# Patient Record
Sex: MALE | Race: WHITE | NOT HISPANIC OR LATINO | URBAN - METROPOLITAN AREA
[De-identification: names, ages, dates, MRNs, and addresses within clinical notes are randomized per-mention and may not be internally consistent; named-entity substitution may affect disease eponyms.]

---

## 2018-09-19 ENCOUNTER — EMERGENCY (EMERGENCY)
Facility: HOSPITAL | Age: 48
LOS: 1 days | Discharge: HOME | End: 2018-09-19
Attending: EMERGENCY MEDICINE | Admitting: EMERGENCY MEDICINE
Payer: SELF-PAY

## 2018-09-19 VITALS
RESPIRATION RATE: 18 BRPM | TEMPERATURE: 98 F | SYSTOLIC BLOOD PRESSURE: 124 MMHG | DIASTOLIC BLOOD PRESSURE: 87 MMHG | HEART RATE: 84 BPM | OXYGEN SATURATION: 97 %

## 2018-09-19 DIAGNOSIS — R21 RASH AND OTHER NONSPECIFIC SKIN ERUPTION: ICD-10-CM

## 2018-09-19 DIAGNOSIS — L30.9 DERMATITIS, UNSPECIFIED: ICD-10-CM

## 2018-09-19 DIAGNOSIS — B02.9 ZOSTER WITHOUT COMPLICATIONS: ICD-10-CM

## 2018-09-19 PROCEDURE — 99284 EMERGENCY DEPT VISIT MOD MDM: CPT

## 2018-09-19 PROCEDURE — 99283 EMERGENCY DEPT VISIT LOW MDM: CPT

## 2018-09-19 RX ORDER — VALACYCLOVIR 500 MG/1
1 TABLET, FILM COATED ORAL
Qty: 21 | Refills: 0 | OUTPATIENT
Start: 2018-09-19 | End: 2018-09-25

## 2018-09-19 NOTE — ED PROVIDER NOTE - SKIN, MLM
+ Dermatitis to right chest wall consistent with herpes zoster, no signs of secondary infection.  Skin is otherwise normal color for race, warm, dry and intact.

## 2018-09-19 NOTE — ED PROVIDER NOTE - OBJECTIVE STATEMENT
47 year old male presents to ED with concern for rash to right chest wall consistent with herpes zoster.  Patient states he had shingles "about 13 years ago and it was the same thing."  He notes prodrome of pain to chest wall and later noticed the rash.  He admits to associated burning sensation to the overlying area.  No fever, chills, shortness of breath, abdominal pain, nausea, vomiting, changes to bowel movements or any additional acute complaints or concerns at this time.

## 2018-09-19 NOTE — ED ADULT NURSE NOTE - OBJECTIVE STATEMENT
Pt presents c/o 7/10 right upper chest wall pain r/t rash.  Pt elicits it is difficult to move his arms d/t pain.  Pt denies fever/chills, N/V/D or exposure to environmental toxins.  Pt is pending eval by [facility].

## 2018-09-19 NOTE — ED ADULT NURSE NOTE - CHPI ED NUR SYMPTOMS NEG
no vomiting/no inflammation/no chills/no decreased eating/drinking/no body aches/no fever/no confusion/no petechia

## 2018-09-19 NOTE — ED PROVIDER NOTE - MEDICAL DECISION MAKING DETAILS
patient in ED with concern for rash to right chest consistent with herpes zoster.  Given rx for steroid, anti viral and pain meds.  He will follow up with his PCP in 1-2 days for re evaluation and return to ED should symptoms worsen prior to this recommended follow up.  Patient aware of plan and verbalizes his understanding.

## 2018-10-13 ENCOUNTER — EMERGENCY (EMERGENCY)
Facility: HOSPITAL | Age: 48
LOS: 1 days | Discharge: HOME | End: 2018-10-13
Attending: EMERGENCY MEDICINE | Admitting: EMERGENCY MEDICINE
Payer: SELF-PAY

## 2018-10-13 VITALS
OXYGEN SATURATION: 98 % | DIASTOLIC BLOOD PRESSURE: 113 MMHG | SYSTOLIC BLOOD PRESSURE: 163 MMHG | TEMPERATURE: 98 F | HEART RATE: 72 BPM | RESPIRATION RATE: 16 BRPM

## 2018-10-13 PROCEDURE — 99283 EMERGENCY DEPT VISIT LOW MDM: CPT | Mod: 25

## 2018-10-13 PROCEDURE — 99053 MED SERV 10PM-8AM 24 HR FAC: CPT

## 2018-10-13 RX ORDER — TRAMADOL HYDROCHLORIDE 50 MG/1
1 TABLET ORAL
Qty: 18 | Refills: 0 | OUTPATIENT
Start: 2018-10-13 | End: 2018-10-15

## 2018-10-13 RX ORDER — VALACYCLOVIR 500 MG/1
1000 TABLET, FILM COATED ORAL ONCE
Qty: 0 | Refills: 0 | Status: COMPLETED | OUTPATIENT
Start: 2018-10-13 | End: 2018-10-13

## 2018-10-13 RX ORDER — OXYCODONE AND ACETAMINOPHEN 5; 325 MG/1; MG/1
2 TABLET ORAL ONCE
Qty: 0 | Refills: 0 | Status: DISCONTINUED | OUTPATIENT
Start: 2018-10-13 | End: 2018-10-13

## 2018-10-13 RX ORDER — VALACYCLOVIR 500 MG/1
1 TABLET, FILM COATED ORAL
Qty: 28 | Refills: 0 | OUTPATIENT
Start: 2018-10-13 | End: 2018-10-26

## 2018-10-13 RX ADMIN — VALACYCLOVIR 1000 MILLIGRAM(S): 500 TABLET, FILM COATED ORAL at 04:40

## 2018-10-13 RX ADMIN — OXYCODONE AND ACETAMINOPHEN 2 TABLET(S): 5; 325 TABLET ORAL at 04:40

## 2018-10-13 NOTE — ED ADULT NURSE NOTE - NSIMPLEMENTINTERV_GEN_ALL_ED
Implemented All Universal Safety Interventions:  [place] to call system. Call bell, personal items and telephone within reach. Instruct patient to call for assistance. Room bathroom lighting operational. Non-slip footwear when patient is off stretcher. Physically safe environment: no spills, clutter or unnecessary equipment. Stretcher in lowest position, wheels locked, appropriate side rails in place.

## 2018-10-13 NOTE — ED ADULT NURSE NOTE - OBJECTIVE STATEMENT
pt aox4. neurologically wnl. no sob or difficulty breathing noted. lung sounds clear bilaterally. skin appropriate for ethnicity, warm and dry. clustered open rash noted to R chest that is painful and burning. cap refill < 2 secs. pulses 2+ and regular. abd rounded soft and nontender.

## 2018-10-13 NOTE — ED ADULT TRIAGE NOTE - CHIEF COMPLAINT QUOTE
ambulatory, complaining of worsening rash to right chest. States he was dx with herpes zoster last 09/19, completed his meds.

## 2018-10-17 DIAGNOSIS — R07.89 OTHER CHEST PAIN: ICD-10-CM

## 2018-10-17 DIAGNOSIS — Z79.899 OTHER LONG TERM (CURRENT) DRUG THERAPY: ICD-10-CM

## 2018-10-17 DIAGNOSIS — F17.210 NICOTINE DEPENDENCE, CIGARETTES, UNCOMPLICATED: ICD-10-CM

## 2018-10-17 DIAGNOSIS — B02.9 ZOSTER WITHOUT COMPLICATIONS: ICD-10-CM

## 2018-10-17 DIAGNOSIS — Z79.52 LONG TERM (CURRENT) USE OF SYSTEMIC STEROIDS: ICD-10-CM

## 2018-10-17 DIAGNOSIS — Z79.2 LONG TERM (CURRENT) USE OF ANTIBIOTICS: ICD-10-CM

## 2020-10-29 ENCOUNTER — EMERGENCY (EMERGENCY)
Facility: HOSPITAL | Age: 50
LOS: 1 days | Discharge: HOME | End: 2020-10-29
Attending: EMERGENCY MEDICINE | Admitting: EMERGENCY MEDICINE
Payer: SELF-PAY

## 2020-10-29 VITALS
RESPIRATION RATE: 18 BRPM | DIASTOLIC BLOOD PRESSURE: 78 MMHG | OXYGEN SATURATION: 98 % | HEART RATE: 82 BPM | SYSTOLIC BLOOD PRESSURE: 125 MMHG

## 2020-10-29 VITALS
WEIGHT: 300.05 LBS | DIASTOLIC BLOOD PRESSURE: 87 MMHG | SYSTOLIC BLOOD PRESSURE: 127 MMHG | TEMPERATURE: 98 F | OXYGEN SATURATION: 96 % | RESPIRATION RATE: 16 BRPM | HEART RATE: 71 BPM | HEIGHT: 74 IN

## 2020-10-29 DIAGNOSIS — B02.9 ZOSTER WITHOUT COMPLICATIONS: ICD-10-CM

## 2020-10-29 DIAGNOSIS — L29.9 PRURITUS, UNSPECIFIED: ICD-10-CM

## 2020-10-29 PROCEDURE — 99283 EMERGENCY DEPT VISIT LOW MDM: CPT

## 2020-10-29 RX ORDER — VALACYCLOVIR 500 MG/1
1000 TABLET, FILM COATED ORAL ONCE
Refills: 0 | Status: COMPLETED | OUTPATIENT
Start: 2020-10-29 | End: 2020-10-29

## 2020-10-29 RX ORDER — OXYCODONE AND ACETAMINOPHEN 5; 325 MG/1; MG/1
1 TABLET ORAL ONCE
Refills: 0 | Status: DISCONTINUED | OUTPATIENT
Start: 2020-10-29 | End: 2020-10-29

## 2020-10-29 RX ORDER — VALACYCLOVIR 500 MG/1
1 TABLET, FILM COATED ORAL
Qty: 21 | Refills: 0
Start: 2020-10-29 | End: 2020-11-04

## 2020-10-29 RX ORDER — OXYCODONE AND ACETAMINOPHEN 5; 325 MG/1; MG/1
1 TABLET ORAL
Qty: 9 | Refills: 0
Start: 2020-10-29 | End: 2020-10-31

## 2020-10-29 RX ADMIN — VALACYCLOVIR 1000 MILLIGRAM(S): 500 TABLET, FILM COATED ORAL at 11:53

## 2020-10-29 RX ADMIN — OXYCODONE AND ACETAMINOPHEN 1 TABLET(S): 5; 325 TABLET ORAL at 11:53

## 2020-10-29 NOTE — ED PROVIDER NOTE - PATIENT PORTAL LINK FT
You can access the FollowMyHealth Patient Portal offered by [facility] by registering at the following website: [email or web address]. By joining [facility]’s FollowMyHealth portal, you will also be able to view your health information using other applications (apps) compatible with our system.

## 2020-10-29 NOTE — ED ADULT NURSE NOTE - OBJECTIVE STATEMENT
Pt p/w shingles rash x 1 d with burning to site x 3 days. States rash started yesterday to right chest wall with +chest pain. Denies fevers, chills, SOB.

## 2020-10-29 NOTE — ED PROVIDER NOTE - OBJECTIVE STATEMENT
The pt is a 48 y/o M, who presents to ED c/o shingles rash x 1 d. States that had some burning to site 3 d ago, but rash started yest, rash to R chest wall, + pain, + itching, has had singles in past and requesting 3 days of percocet "for acute pain". Denies fevers, chills, pus, bleeding, any other c/o

## 2020-10-29 NOTE — ED PROVIDER NOTE - SKIN, MLM
+ rash to r chest wall T7 dermatone, + scratch marks, + vesicles on erythematous base, no pus, no bleeding, no swelling, no super imposed inf

## 2020-10-29 NOTE — ED PROVIDER NOTE - NSFOLLOWUPINSTRUCTIONS_ED_ALL_ED_FT
Shingles  WHAT YOU NEED TO KNOW:  Shingles is a painful rash. Shingles is caused by the same virus that causes chickenpox (varicella-zoster). After you get chickenpox, the virus stays in your body for several years without causing any symptoms. Shingles occurs when the virus becomes active again. The active virus travels along a nerve to your skin and causes a rash.  DISCHARGE INSTRUCTIONS:  Call your local emergency number (911 in the ) if:   •You have trouble moving your arms, legs, or face.  •You become confused, or have difficulty speaking.  •You have a seizure.  Seek care immediately if:   •You have weakness in an arm or leg.  •You have dizziness, a severe headache, or hearing or vision loss.  •You have painful, red, warm skin around the blisters, or the blisters drain pus.  •Your neck is stiff or you have trouble moving it.  Call your doctor if:   •You feel weak or have a headache.  •You have a cough, chills, or a fever.  •You have abdominal pain or nausea, or you are vomiting.  •Your rash becomes more itchy or painful.  •Your rash spreads to other parts of your body.  •Your pain worsens and does not go away even after you take medicine.  •You have questions or concerns about your condition or care.  Medicines: You may need any of the following:  •Antiviral medicine helps decrease symptoms and healing time. It may also decrease your risk for nerve pain. You will need to start taking this medicine within 3 days of the start of symptoms to prevent nerve pain.  •Prescription pain medicine may be given. Ask your healthcare provider how to take this medicine safely. Some prescription pain medicines contain acetaminophen. Do not take other medicines that contain acetaminophen without talking to your healthcare provider. Too much acetaminophen may cause liver damage. Prescription pain medicine may cause constipation. Ask your healthcare provider how to prevent or treat constipation.   •Acetaminophen decreases pain and fever. It is available without a doctor's order. Ask how much to take and how often to take it. Follow directions. Read the labels of all other medicines you are using to see if they also contain acetaminophen, or ask your doctor or pharmacist. Acetaminophen can cause liver damage if not taken correctly. Do not use more than 4 grams (4,000 milligrams) total of acetaminophen in one day.   •NSAIDs, such as ibuprofen, help decrease swelling, pain, and fever. This medicine is available with or without a doctor's order. NSAIDs can cause stomach bleeding or kidney problems in certain people. If you take blood thinner medicine, always ask if NSAIDs are safe for you. Always read the medicine label and follow directions. Do not give these medicines to children under 6 months of age without direction from your child's healthcare provider.  •Topical anesthetics are used to numb the skin and decrease pain. They can be a cream, gel, spray, or patch.   •Anticonvulsants decrease nerve pain and may help you sleep at night.  •Antidepressants may be used to decrease nerve pain.  •Take your medicine as directed. Contact your healthcare provider if you think your medicine is not helping or if you have side effects. Tell him or her if you are allergic to any medicine. Keep a list of the medicines, vitamins, and herbs you take. Include the amounts, and when and why you take them. Bring the list or the pill bottles to follow-up visits. Carry your medicine list with you in case of an emergency.  Self-care: Keep your rash clean and dry. Cover your rash with a bandage or clothing. Do not use bandages that stick to your skin. The sticky part may irritate your skin and make your rash last longer.  Prevent the spread of the germs:   •Wash your hands often. Wash your hands several times each day. Wash after you use the bathroom, change a child's diaper, and before you prepare or eat food. Use soap and water every time. Rub your soapy hands together, lacing your fingers. Wash the front and back of your hands, and in between your fingers. Use the fingers of one hand to scrub under the fingernails of the other hand. Wash for at least 20 seconds. Rinse with warm, running water for several seconds. Then dry your hands with a clean towel or paper towel. Use hand  that contains alcohol if soap and water are not available. Do not touch your eyes, nose, or mouth without washing your hands first.  Handwashing  •Cover a sneeze or cough. Use a tissue that covers your mouth and nose. Throw the tissue away in a trash can right away. Use the bend of your arm if a tissue is not available. Wash your hands well with soap and water or use a hand .  •Stay away from others while you are sick. Avoid crowds as much as possible.  •Ask about vaccines you may need. Talk to your healthcare provider about your vaccine history. He or she will tell you which vaccines you need, and when to get them.  Prevent shingles or another shingles outbreak: A vaccine may be given to help prevent shingles. You can get the vaccine even if you already had shingles. The vaccine can help prevent a future outbreak. If you do get shingles again, the vaccine can keep it from becoming severe. The vaccine comes in 2 forms. Your healthcare provider will tell you which form is right for you. The decision is based on your age and any medical conditions you have. A 2-dose vaccine is usually given to adults 50 years or older. A 1-dose vaccine may be given to adults 60 years or older.  For more information:   •Centers for Disease Control and Prevention  [address], [place]  Phone: [phone]  Phone: [phone]  Web Address: http://www.cdc.gov

## 2020-10-29 NOTE — ED PROVIDER NOTE - CLINICAL SUMMARY MEDICAL DECISION MAKING FREE TEXT BOX
pt zoster rash x 1d, has had it in past, no superimposed inf, will tx w/antivirals, prn pain meds, zoster discussed w/pt at length, hand hygiene discussed, advised to not scratch, antihistamines recommended, f/u w/pmd or derm, strict return precautions discussed, pt understands and agrees w/plan rash c/w herpes zoster pain control with Percocet, antihistamines and valtrex

## 2024-12-20 NOTE — ED ADULT NURSE NOTE - NSHISCREENINGQ1_ED_A_ED
Medication:    Disp Refills Start End    metFORMIN (GLUCOPHAGE-XR) 500 MG 24 hr tablet 360 tablet 3 4/26/2024 4/26/2025    Sig - Route: Take 2 tablets by mouth 2 times daily before breakfast and at night. - Oral    Sent to pharmacy as: metFORMIN HCl  MG Oral Tablet Extended Release 24 Hour (GLUCOPHAGE-XR)    Class: Eprescribe    E-Prescribing Status: Receipt confirmed by pharmacy (4/26/2024 10:10 AM CDT)      Last office visit date: 4/26/2024  Medication Refill Protocol Failed.  Failed criteria: SEE BELOW. Sent to clinician to review.       Metformin Antihyperglycemics Refill Protocol - 12 Month Protocol [id number]/2024 01:23 PM   Protocol Details Hgb A1c less than 8 within last 6 months looking at last value -- IF CRITERIA FAILED REFER TO PROTOCOL DETAILS    Medication (including dose and sig) on current meds list    eGFR resulted within last 12 months -- IF CRITERIA FAILED REFER TO PROTOCOL DETAILS    Seen by prescribing provider or same department within the last 12 months or has a future appt in 3 months - IF FAILED PLEASE LOOK AT CHART REVIEW FOR LAST VISIT AND PROCEED ACCORDINGLY    Lipid Panel resulted within last 15 months    eGFR greater than 30 within last 12 months looking at last value OR If eGFR is between 30-44 then has patient had a repeat eGFR resulted in past 6 months -- IF CRITERIA FAILED REFER TO PROTOCOL DETAILS          Controlled Substance: No        Date of any Lab Results pertaining to medication:   Lab Results   Component Value Date    SODIUM 138 04/10/2024    POTASSIUM 4.1 04/10/2024    CHLORIDE 104 04/10/2024    CO2 25 04/10/2024    BUN 12 04/10/2024    CREATININE 1.03 04/10/2024    GLUCOSE 267 (H) 04/10/2024     Hemoglobin A1C (%)   Date Value   04/24/2024 7.5 (H)     Lab Results   Component Value Date    CHOLESTEROL 129 10/25/2023    HDL 72 10/25/2023    CALCLDL 39 10/25/2023    TRIGLYCERIDE 88 10/25/2023           No